# Patient Record
Sex: MALE | Race: WHITE | NOT HISPANIC OR LATINO | URBAN - NONMETROPOLITAN AREA
[De-identification: names, ages, dates, MRNs, and addresses within clinical notes are randomized per-mention and may not be internally consistent; named-entity substitution may affect disease eponyms.]

---

## 2020-11-13 ENCOUNTER — IMPORTED ENCOUNTER (OUTPATIENT)
Dept: URBAN - NONMETROPOLITAN AREA CLINIC 1 | Facility: CLINIC | Age: 55
End: 2020-11-13

## 2020-11-13 PROCEDURE — 92014 COMPRE OPH EXAM EST PT 1/>: CPT

## 2020-11-13 NOTE — PATIENT DISCUSSION
PCO-Explained PCO and RBAs of YAG Capsulotomy to pt. -Pt elects to proceed. YAG Caps OD first then YAG PC OS n/a -Will do procedure @ [facility]. [facility] to schedule.

## 2020-11-16 PROBLEM — H26.493: Noted: 2020-11-16

## 2020-11-16 PROBLEM — Z96.1: Noted: 2020-11-16

## 2020-12-03 ENCOUNTER — IMPORTED ENCOUNTER (OUTPATIENT)
Dept: URBAN - NONMETROPOLITAN AREA CLINIC 1 | Facility: CLINIC | Age: 55
End: 2020-12-03

## 2020-12-03 PROBLEM — Z96.1: Noted: 2020-12-03

## 2020-12-03 PROBLEM — H26.493: Noted: 2020-12-03

## 2020-12-09 ENCOUNTER — IMPORTED ENCOUNTER (OUTPATIENT)
Dept: URBAN - NONMETROPOLITAN AREA CLINIC 1 | Facility: CLINIC | Age: 55
End: 2020-12-09

## 2020-12-09 PROCEDURE — 66821 AFTER CATARACT LASER SURGERY: CPT

## 2020-12-11 ENCOUNTER — IMPORTED ENCOUNTER (OUTPATIENT)
Dept: URBAN - NONMETROPOLITAN AREA CLINIC 1 | Facility: CLINIC | Age: 55
End: 2020-12-11

## 2020-12-11 PROBLEM — H26.493: Noted: 2020-12-11

## 2020-12-11 PROBLEM — Z96.1: Noted: 2020-12-11

## 2020-12-17 ENCOUNTER — IMPORTED ENCOUNTER (OUTPATIENT)
Dept: URBAN - NONMETROPOLITAN AREA CLINIC 1 | Facility: CLINIC | Age: 55
End: 2020-12-17

## 2020-12-17 PROBLEM — Z98.890: Noted: 2020-12-17

## 2020-12-17 PROBLEM — Z96.1: Noted: 2020-12-17

## 2020-12-17 PROCEDURE — 99024 POSTOP FOLLOW-UP VISIT: CPT

## 2020-12-17 NOTE — PATIENT DISCUSSION
s/p YAG PC OU - both intraocular lenses are stable and in place - open capsules OU - [facility] 20/20 today - continue to monitor yearly

## 2022-01-07 ENCOUNTER — IMPORTED ENCOUNTER (OUTPATIENT)
Dept: URBAN - NONMETROPOLITAN AREA CLINIC 1 | Facility: CLINIC | Age: 57
End: 2022-01-07

## 2022-01-07 PROBLEM — Z96.1: Noted: 2022-01-07

## 2022-01-07 PROCEDURE — 92014 COMPRE OPH EXAM EST PT 1/>: CPT

## 2022-01-07 PROCEDURE — 92015 DETERMINE REFRACTIVE STATE: CPT

## 2022-04-09 ASSESSMENT — TONOMETRY
OS_IOP_MMHG: 14
OD_IOP_MMHG: 14

## 2022-04-09 ASSESSMENT — VISUAL ACUITY
OD_CC: 20/20
OS_CC: 20/20
OD_GLARE: 20/40
OS_CC: 20/20
OS_GLARE: 20/40+
OD_CC: 20/40
OD_PH: 20/40
OS_CC: 20/25
OD_CC: 20/20

## 2022-09-28 ENCOUNTER — ESTABLISHED PATIENT (OUTPATIENT)
Dept: RURAL CLINIC 1 | Facility: CLINIC | Age: 57
End: 2022-09-28

## 2022-09-28 DIAGNOSIS — L57.0: ICD-10-CM

## 2022-09-28 PROCEDURE — 92285 EXTERNAL OCULAR PHOTOGRAPHY: CPT

## 2022-09-28 PROCEDURE — 99214 OFFICE O/P EST MOD 30 MIN: CPT

## 2022-09-28 ASSESSMENT — VISUAL ACUITY
OD_SC: 20/20
OS_SC: 20/20